# Patient Record
Sex: FEMALE | Race: WHITE | URBAN - METROPOLITAN AREA
[De-identification: names, ages, dates, MRNs, and addresses within clinical notes are randomized per-mention and may not be internally consistent; named-entity substitution may affect disease eponyms.]

---

## 2018-09-19 ENCOUNTER — APPOINTMENT (OUTPATIENT)
Dept: WOUND CARE | Facility: HOSPITAL | Age: 58
End: 2018-09-19
Payer: MEDICARE

## 2021-10-22 ENCOUNTER — OFFICE VISIT (OUTPATIENT)
Dept: OTOLARYNGOLOGY | Facility: CLINIC | Age: 61
End: 2021-10-22
Payer: MEDICARE

## 2021-10-22 VITALS — BODY MASS INDEX: 20.62 KG/M2 | WEIGHT: 105 LBS | HEIGHT: 60 IN

## 2021-10-22 DIAGNOSIS — H90.A31 MIXED CONDUCTIVE AND SENSORINEURAL HEARING LOSS, UNILATERAL, RIGHT EAR WITH RESTRICTED HEARING ON THE CONTRALATERAL SIDE: Primary | ICD-10-CM

## 2021-10-22 DIAGNOSIS — Z85.819 HISTORY OF NASOPHARYNGEAL CANCER: ICD-10-CM

## 2021-10-22 DIAGNOSIS — T17.908D CHRONIC PULMONARY ASPIRATION, SUBSEQUENT ENCOUNTER: ICD-10-CM

## 2021-10-22 DIAGNOSIS — J34.89 NASAL CRUSTING: ICD-10-CM

## 2021-10-22 PROCEDURE — 3008F BODY MASS INDEX DOCD: CPT | Performed by: SPECIALIST

## 2021-10-22 PROCEDURE — 99213 OFFICE O/P EST LOW 20 MIN: CPT | Performed by: SPECIALIST

## 2021-10-22 PROCEDURE — 92511 NASOPHARYNGOSCOPY: CPT | Performed by: SPECIALIST

## 2021-10-22 RX ORDER — AZITHROMYCIN 200 MG/5ML
POWDER, FOR SUSPENSION ORAL
Qty: 18 ML | Refills: 0 | Status: SHIPPED | OUTPATIENT
Start: 2021-10-22

## 2021-10-22 NOTE — PATIENT INSTRUCTIONS
More fluid  in the right ear than the left ear. We will try Zithromax. If it is not helpful, a right PE tube can be considered. A large amount of crusting in your nasopharynx although I do not see any recurrent disease.

## 2021-10-22 NOTE — PROGRESS NOTES
Dion Mobley is a 57year old female. Patient presents with:  Ear Problem: pt presents today for both ear check up. pt has a hard time hearing. HPI:   Of difficulty hearing had an audiogram on 10/5/2021.   It shows next hearing loss right much great Normal.   Head/Face Facial features - Normal. Eyebrows - Normal. Skull - Normal.   Eyes Conjunctiva - Right: Normal, Left: Normal. Pupil - Right: Normal, Left: Normal.    Ears Inspection - Right: Normal, Left: Normal.   Canal - Right: Normal, Left: Normal. side  Trial of Zithromax if not better can consider a right PE tube. 2. History of nasopharyngeal cancer  No evidence of recurrent disease but chronic crusting in the area.     3. Chronic pulmonary aspiration, subsequent encounter  Large amount of oropha

## 2021-11-22 ENCOUNTER — OFFICE VISIT (OUTPATIENT)
Dept: OTOLARYNGOLOGY | Facility: CLINIC | Age: 61
End: 2021-11-22
Payer: MEDICARE

## 2021-11-22 VITALS — BODY MASS INDEX: 20.62 KG/M2 | HEIGHT: 60 IN | WEIGHT: 105 LBS

## 2021-11-22 DIAGNOSIS — Z85.819 HISTORY OF NASOPHARYNGEAL CANCER: ICD-10-CM

## 2021-11-22 DIAGNOSIS — H65.23 BILATERAL CHRONIC SEROUS OTITIS MEDIA: Primary | ICD-10-CM

## 2021-11-22 DIAGNOSIS — J34.89 NASAL CRUSTING: ICD-10-CM

## 2021-11-22 DIAGNOSIS — H90.6 MIXED HEARING LOSS, BILATERAL: ICD-10-CM

## 2021-11-22 PROCEDURE — 3008F BODY MASS INDEX DOCD: CPT | Performed by: SPECIALIST

## 2021-11-22 PROCEDURE — 99213 OFFICE O/P EST LOW 20 MIN: CPT | Performed by: SPECIALIST

## 2021-11-22 PROCEDURE — 69433 CREATE EARDRUM OPENING: CPT | Performed by: SPECIALIST

## 2021-11-22 NOTE — PATIENT INSTRUCTIONS
You had tubes placed in your bilateral ear canals. Your hearing was immediately improved. There is more fluid on the right than the left ear. Your nose was also fully suctioned of debris. Follow-up in 2 weeks time, sooner if problems.   Floxin drops use

## 2021-11-22 NOTE — PROGRESS NOTES
Sheri Valle is a 57year old female. Patient presents with: Follow - Up: pt presents today for 3 week f/u sensorineural hearing loss. pt states feeling the same sxs.      HPI:   Lateral mixed hearing loss with fluid in the middle ears right greater th System Details   Skin Inspection - Normal.   Constitutional Overall appearance - Normal.   Head/Face Facial features - Normal. Eyebrows - Normal. Skull - Normal.   Eyes Conjunctiva - Right: Normal, Left: Normal. Pupil - Right: Normal, Left: Normal.    Ea indicates understanding of these issues and agrees to the plan.       Antoni Pagan MD  11/22/2021  12:18 PM

## 2021-12-17 ENCOUNTER — TELEPHONE (OUTPATIENT)
Dept: OTOLARYNGOLOGY | Facility: CLINIC | Age: 61
End: 2021-12-17

## 2021-12-17 NOTE — TELEPHONE ENCOUNTER
Per nephew (with pt) pt is not feeling well and had to cancel her appointment. Per nephew asking if pt can get an antibiotic and states she will need a new appointment. Per nephew pt cannot not speak well.  Please advise

## 2021-12-21 NOTE — TELEPHONE ENCOUNTER
Spoke with patient's nephew. Patient went to ER Sunday night due to difficulty breathing and will be admitted to [facility] when bed is available.

## 2022-01-22 ENCOUNTER — TELEPHONE (OUTPATIENT)
Dept: OTOLARYNGOLOGY | Facility: CLINIC | Age: 62
End: 2022-01-22

## 2022-02-19 ENCOUNTER — TELEPHONE (OUTPATIENT)
Dept: OTOLARYNGOLOGY | Facility: CLINIC | Age: 62
End: 2022-02-19

## 2022-04-08 ENCOUNTER — OFFICE VISIT (OUTPATIENT)
Dept: OTOLARYNGOLOGY | Facility: CLINIC | Age: 62
End: 2022-04-08
Payer: MEDICARE

## 2022-04-08 VITALS — WEIGHT: 105 LBS | BODY MASS INDEX: 20.62 KG/M2 | HEIGHT: 60 IN

## 2022-04-08 DIAGNOSIS — H72.93 BILATERAL TYMPANIC MEMBRANE PERFORATION: Primary | ICD-10-CM

## 2022-04-08 DIAGNOSIS — Z85.819 HISTORY OF NASOPHARYNGEAL CANCER: ICD-10-CM

## 2022-04-08 DIAGNOSIS — J34.89 NASAL CRUSTING: ICD-10-CM

## 2022-04-08 PROCEDURE — 99213 OFFICE O/P EST LOW 20 MIN: CPT | Performed by: SPECIALIST

## 2022-04-08 PROCEDURE — 3008F BODY MASS INDEX DOCD: CPT | Performed by: SPECIALIST

## 2022-04-08 NOTE — PATIENT INSTRUCTIONS
Your right PE tube was extruded. There is a small perforation present. There was a large amount of cerumen which was fully cleaned. In your left ear the PE tube was still in position, however there was at a larger perforation around it. The PE tube was removed on this side to allow for healing of the tympanic membrane. Note a large amount of nasal nasopharyngeal and oropharyngeal purulence which were suctioned as much as possible. Follow-up in 6 to 8 weeks time.

## 2022-04-28 ENCOUNTER — TELEPHONE (OUTPATIENT)
Dept: OTOLARYNGOLOGY | Facility: CLINIC | Age: 62
End: 2022-04-28

## 2022-04-28 RX ORDER — TOBRAMYCIN AND DEXAMETHASONE 3; 1 MG/ML; MG/ML
SUSPENSION/ DROPS OPHTHALMIC
Qty: 10 ML | Refills: 1 | Status: SHIPPED | OUTPATIENT
Start: 2022-04-28

## 2022-04-28 NOTE — TELEPHONE ENCOUNTER
This refill request is being sent to the provider for the following reason:  []Patient has not had an appointment within the past 12 months but has made an appointment on: ___  [x]Medication is not within protocol  []Patient did not complete follow up recommendations  []Other: ___    Do not see this medication in the patients medications or in LOV notes. Please advise.

## 2022-04-28 NOTE — TELEPHONE ENCOUNTER
.med  Refill request for   TOBRAMYCINE/DEXAMETHASONE SUSP.  5ML. INSTILL 1 SPRAY IN EACH NOSTRIL TWICE DAILY.   IN NURSES BL[facility] [employer]